# Patient Record
Sex: MALE | Race: WHITE | NOT HISPANIC OR LATINO | Employment: OTHER | RURAL
[De-identification: names, ages, dates, MRNs, and addresses within clinical notes are randomized per-mention and may not be internally consistent; named-entity substitution may affect disease eponyms.]

---

## 2017-02-02 ENCOUNTER — HISTORICAL (OUTPATIENT)
Dept: ADMINISTRATIVE | Facility: HOSPITAL | Age: 77
End: 2017-02-02

## 2017-02-06 LAB
LAB AP CLINICAL INFORMATION: NORMAL
LAB AP COMMENTS: NORMAL
LAB AP DIAGNOSIS - HISTORICAL: NORMAL
LAB AP GROSS PATHOLOGY - HISTORICAL: NORMAL
LAB AP SPECIMEN SUBMITTED - HISTORICAL: NORMAL

## 2017-02-13 ENCOUNTER — HISTORICAL (OUTPATIENT)
Dept: ADMINISTRATIVE | Facility: HOSPITAL | Age: 77
End: 2017-02-13

## 2017-02-27 ENCOUNTER — HISTORICAL (OUTPATIENT)
Dept: ADMINISTRATIVE | Facility: HOSPITAL | Age: 77
End: 2017-02-27

## 2017-02-28 LAB
LAB AP CLINICAL INFORMATION: NORMAL
LAB AP DIAGNOSIS - HISTORICAL: NORMAL
LAB AP GROSS PATHOLOGY - HISTORICAL: NORMAL
LAB AP SPECIMEN SUBMITTED - HISTORICAL: NORMAL

## 2021-02-02 ENCOUNTER — HISTORICAL (OUTPATIENT)
Dept: ADMINISTRATIVE | Facility: HOSPITAL | Age: 81
End: 2021-02-02

## 2021-02-02 LAB — SARS-COV-2 RNA AMPLIFICATION, QUAL: NEGATIVE

## 2022-10-28 ENCOUNTER — OFFICE VISIT (OUTPATIENT)
Dept: NEUROLOGY | Facility: CLINIC | Age: 82
End: 2022-10-28
Payer: MEDICARE

## 2022-10-28 VITALS
HEIGHT: 72 IN | SYSTOLIC BLOOD PRESSURE: 118 MMHG | BODY MASS INDEX: 23.95 KG/M2 | WEIGHT: 176.81 LBS | DIASTOLIC BLOOD PRESSURE: 78 MMHG | OXYGEN SATURATION: 98 % | HEART RATE: 80 BPM | RESPIRATION RATE: 20 BRPM

## 2022-10-28 DIAGNOSIS — F03.90 DEMENTIA, UNSPECIFIED DEMENTIA SEVERITY, UNSPECIFIED DEMENTIA TYPE, UNSPECIFIED WHETHER BEHAVIORAL, PSYCHOTIC, OR MOOD DISTURBANCE OR ANXIETY: Primary | ICD-10-CM

## 2022-10-28 PROCEDURE — 99204 OFFICE O/P NEW MOD 45 MIN: CPT | Mod: S$PBB,,, | Performed by: PSYCHIATRY & NEUROLOGY

## 2022-10-28 PROCEDURE — 99204 PR OFFICE/OUTPT VISIT, NEW, LEVL IV, 45-59 MIN: ICD-10-PCS | Mod: S$PBB,,, | Performed by: PSYCHIATRY & NEUROLOGY

## 2022-10-28 PROCEDURE — 99215 OFFICE O/P EST HI 40 MIN: CPT | Mod: PBBFAC | Performed by: PSYCHIATRY & NEUROLOGY

## 2022-10-28 RX ORDER — CEPHALEXIN 500 MG/1
500 CAPSULE ORAL
COMMUNITY
Start: 2022-02-15

## 2022-10-28 RX ORDER — ATORVASTATIN CALCIUM 20 MG/1
20 TABLET, FILM COATED ORAL DAILY
COMMUNITY
Start: 2022-10-19

## 2022-10-28 RX ORDER — DUTASTERIDE 0.5 MG/1
0.5 CAPSULE, LIQUID FILLED ORAL
COMMUNITY
Start: 2022-02-08

## 2022-10-28 RX ORDER — DONEPEZIL HYDROCHLORIDE 5 MG/1
5 TABLET, FILM COATED ORAL NIGHTLY
COMMUNITY
Start: 2022-03-10

## 2022-10-28 RX ORDER — LORATADINE 10 MG/1
10 TABLET ORAL DAILY
COMMUNITY
Start: 2022-08-09

## 2022-10-28 RX ORDER — TAMSULOSIN HYDROCHLORIDE 0.4 MG/1
1 CAPSULE ORAL 2 TIMES DAILY
COMMUNITY
Start: 2022-01-07

## 2022-10-28 RX ORDER — ZALEPLON 5 MG/1
CAPSULE ORAL
COMMUNITY
Start: 2022-10-18

## 2022-10-28 RX ORDER — BUDESONIDE AND FORMOTEROL FUMARATE DIHYDRATE 80; 4.5 UG/1; UG/1
AEROSOL RESPIRATORY (INHALATION)
COMMUNITY
Start: 2022-09-29

## 2022-10-28 RX ORDER — CYANOCOBALAMIN (VITAMIN B-12) 500 MCG
TABLET ORAL
COMMUNITY
Start: 2022-09-15

## 2022-10-28 RX ORDER — AMITRIPTYLINE HYDROCHLORIDE 25 MG/1
25 TABLET, FILM COATED ORAL NIGHTLY
COMMUNITY
Start: 2022-10-19

## 2022-10-28 RX ORDER — FUROSEMIDE 40 MG/1
40 TABLET ORAL 2 TIMES DAILY
COMMUNITY
Start: 2022-03-10

## 2022-10-28 RX ORDER — CLONAZEPAM 1 MG/1
1 TABLET ORAL NIGHTLY
Qty: 90 TABLET | Refills: 3 | Status: SHIPPED | OUTPATIENT
Start: 2022-10-28 | End: 2023-10-28

## 2022-10-28 RX ORDER — UMECLIDINIUM BROMIDE AND VILANTEROL TRIFENATATE 62.5; 25 UG/1; UG/1
1 POWDER RESPIRATORY (INHALATION)
COMMUNITY
Start: 2022-03-14

## 2022-10-28 RX ORDER — POTASSIUM CHLORIDE 20 MEQ/1
20 TABLET, EXTENDED RELEASE ORAL DAILY
COMMUNITY
Start: 2022-10-04

## 2022-10-28 RX ORDER — MEMANTINE HYDROCHLORIDE 5 MG/1
5 TABLET ORAL
COMMUNITY
Start: 2022-02-08

## 2022-10-28 RX ORDER — METOPROLOL TARTRATE 100 MG/1
100 TABLET ORAL
COMMUNITY
Start: 2022-03-17

## 2022-10-28 RX ORDER — NAPROXEN 500 MG/1
500 TABLET ORAL
COMMUNITY
Start: 2022-03-10

## 2022-10-28 RX ORDER — OMEPRAZOLE 40 MG/1
40 CAPSULE, DELAYED RELEASE ORAL
COMMUNITY
Start: 2022-03-10

## 2022-10-28 RX ORDER — PREDNISOLONE ACETATE 10 MG/ML
SUSPENSION/ DROPS OPHTHALMIC
COMMUNITY
Start: 2022-08-04

## 2022-10-28 NOTE — PATIENT INSTRUCTIONS
Cont Aricept and Namenda  Good family support from wife   Avoid daytime napping   Stop Elavil, Sonata, Mirtazipine  Trial of Klonopin bedtime   F/u 3 months

## 2022-10-28 NOTE — PROGRESS NOTES
Subjective:       Patient ID: Giovanny Nascimento is a 82 y.o. male     Chief Complaint:    Chief Complaint   Patient presents with    Dementia        Allergies:  Patient has no known allergies.    Current Medications:    Outpatient Encounter Medications as of 10/28/2022   Medication Sig Dispense Refill    amitriptyline (ELAVIL) 25 MG tablet Take 25 mg by mouth every evening.      atorvastatin (LIPITOR) 20 MG tablet Take 20 mg by mouth once daily.      cephALEXin (KEFLEX) 500 MG capsule Take 500 mg by mouth.      donepeziL (ARICEPT) 5 MG tablet Take 5 mg by mouth every evening.      dutasteride (AVODART) 0.5 mg capsule Take 0.5 mg by mouth.      furosemide (LASIX) 40 MG tablet Take 40 mg by mouth 2 (two) times a day.      loratadine (CLARITIN) 10 mg tablet Take 10 mg by mouth once daily.      memantine (NAMENDA) 5 MG Tab Take 5 mg by mouth.      metoprolol tartrate (LOPRESSOR) 100 MG tablet Take 100 mg by mouth.      naproxen (NAPROSYN) 500 MG tablet Take 500 mg by mouth.      omeprazole (PRILOSEC) 40 MG capsule Take 40 mg by mouth.      potassium chloride SA (K-DUR,KLOR-CON) 20 MEQ tablet Take 20 mEq by mouth once daily.      prednisoLONE acetate (PRED FORTE) 1 % DrpS Apply four times daily to both eyes for 2 weeks      rivaroxaban (XARELTO) 10 mg Tab Take 10 mg by mouth.      SYMBICORT 80-4.5 mcg/actuation HFAA USE 2 PUFFS BY MOUTH 2 TIMES A DAY      tamsulosin (FLOMAX) 0.4 mg Cap Take 1 capsule by mouth 2 (two) times daily.      umeclidinium-vilanteroL (ANORO ELLIPTA) 62.5-25 mcg/actuation DsDv Take 1 puff by mouth.      VITAMIN B-12 500 MCG tablet TAKE 1 TABLET BY MOUTH once DAILY      zaleplon (SONATA) 5 MG Cap TAKE 1 CAPSULE BY MOUTH EVERY DAY AT BEDTIME AS NEEDED (MAY CAUSE DROWSINESS)       No facility-administered encounter medications on file as of 10/28/2022.       History of Present Illness  81 yo WM w/ few yrs of short term memory issues worsening over last few years - forgetful, misplacing things, not  "driving and wife handles finances and legal issues   Pt now on Aricept 5 mg and Namenda 5 mg as could not tolerated 10 mg   He is "sundowning" for last year and sonata and neuroleptics and Elavil have not worked at all-        Past Medical History:   Diagnosis Date    Hypertension     Stroke        History reviewed. No pertinent surgical history.    Social History  Mr. Nascimento  reports that he has never smoked. He has never used smokeless tobacco. He reports current alcohol use of about 2.0 standard drinks per week.    Family History  s Azam family history is not on file.    Review of Systems  Review of Systems   Psychiatric/Behavioral:  Positive for memory loss.    All other systems reviewed and are negative.   Objective:   /78 (BP Location: Left arm, Patient Position: Sitting, BP Method: Medium (Manual))   Pulse 80   Resp 20   Ht 6' (1.829 m)   Wt 80.2 kg (176 lb 12.8 oz)   SpO2 98%   BMI 23.98 kg/m²    NEUROLOGICAL EXAMINATION:     MENTAL STATUS   Oriented to person, place, and time.   Level of consciousness: alert  Knowledge: consistent with education.        Mod severe dementia      CRANIAL NERVES   Cranial nerves II through XII intact.     MOTOR EXAM     Strength   Strength 5/5 throughout.     GAIT AND COORDINATION     Gait  Gait: normal     Physical Exam  Vitals reviewed.   Neurological:      Mental Status: He is alert and oriented to person, place, and time. Mental status is at baseline.      Cranial Nerves: Cranial nerves 2-12 are intact.      Motor: Motor strength is normal.      Gait: Gait is intact.        Assessment:     Dementia, unspecified dementia severity, unspecified dementia type, unspecified whether behavioral, psychotic, or mood disturbance or anxiety       Primary Diagnosis and ICD10  Dementia, unspecified dementia severity, unspecified dementia type, unspecified whether behavioral, psychotic, or mood disturbance or anxiety [F03.90]    Plan:     Patient Instructions   Cont " Aricept and Namenda  Good family support from wife   Avoid daytime napping   Stop Elavil, Sonata, Mirtazipine  Trial of Klonopin bedtime   F/u 3 months    There are no discontinued medications.    Requested Prescriptions      No prescriptions requested or ordered in this encounter

## 2023-07-27 DIAGNOSIS — Z93.1 S/P PERCUTANEOUS ENDOSCOPIC GASTROSTOMY (PEG) TUBE PLACEMENT: Primary | ICD-10-CM

## 2023-07-27 DIAGNOSIS — R13.10 SWALLOWING DIFFICULTY: ICD-10-CM
